# Patient Record
Sex: MALE | Race: OTHER | Employment: FULL TIME | URBAN - METROPOLITAN AREA
[De-identification: names, ages, dates, MRNs, and addresses within clinical notes are randomized per-mention and may not be internally consistent; named-entity substitution may affect disease eponyms.]

---

## 2019-03-02 ENCOUNTER — HOSPITAL ENCOUNTER (EMERGENCY)
Facility: HOSPITAL | Age: 39
Discharge: HOME | End: 2019-03-02
Attending: EMERGENCY MEDICINE
Payer: COMMERCIAL

## 2019-03-02 VITALS
TEMPERATURE: 98 F | SYSTOLIC BLOOD PRESSURE: 110 MMHG | WEIGHT: 128 LBS | OXYGEN SATURATION: 100 % | HEART RATE: 52 BPM | RESPIRATION RATE: 16 BRPM | DIASTOLIC BLOOD PRESSURE: 71 MMHG | HEIGHT: 64 IN | BODY MASS INDEX: 21.85 KG/M2

## 2019-03-02 DIAGNOSIS — S61.412A LACERATION OF LEFT HAND, FOREIGN BODY PRESENCE UNSPECIFIED, INITIAL ENCOUNTER: Primary | ICD-10-CM

## 2019-03-02 PROCEDURE — 90471 IMMUNIZATION ADMIN: CPT

## 2019-03-02 PROCEDURE — 12001 RPR S/N/AX/GEN/TRNK 2.5CM/<: CPT

## 2019-03-02 PROCEDURE — 99283 EMERGENCY DEPT VISIT LOW MDM: CPT

## 2019-03-02 NOTE — ED INITIAL ASSESSMENT (HPI)
Pt to ER c/o left hand laceration while cutting a wire at work. Bleeding is controlled at this time.

## 2019-03-02 NOTE — ED NOTES
Signs and symptoms of infection were discussed with the pt. Pt verbalized understanding. Pt was advised not to get his hand soaked for next couple of days.

## 2019-03-02 NOTE — ED NOTES
Pt's company is not contracted with [employer]. Pt was advised to follow up with his manager regarding further testing.

## 2019-03-02 NOTE — ED PROVIDER NOTES
Patient Seen in: [facility] Emergency Department    History   Patient presents with:  Laceration Abrasion (integumentary)    Stated Complaint: laceration to left hand from utility knife    HPI    80-year-old  male who presents emerged from t Reviewed - No data to display       Laceration was anesthetized with lidocaine locally. The wound was cleansed and irrigated copiously. There was no visible foreign body within the wound.  The wound was closed using a simple closure with 5-0 nylon and 5-0

## 2019-03-08 ENCOUNTER — HOSPITAL ENCOUNTER (EMERGENCY)
Facility: HOSPITAL | Age: 39
Discharge: HOME | End: 2019-03-08
Attending: EMERGENCY MEDICINE
Payer: COMMERCIAL

## 2019-03-08 VITALS
RESPIRATION RATE: 16 BRPM | DIASTOLIC BLOOD PRESSURE: 77 MMHG | TEMPERATURE: 98 F | OXYGEN SATURATION: 100 % | SYSTOLIC BLOOD PRESSURE: 114 MMHG | HEART RATE: 52 BPM

## 2019-03-08 DIAGNOSIS — Z51.89 VISIT FOR WOUND CHECK: Primary | ICD-10-CM

## 2019-03-08 PROCEDURE — 99281 EMR DPT VST MAYX REQ PHY/QHP: CPT

## 2019-03-08 NOTE — ED PROVIDER NOTES
Patient Seen in: [facility] Emergency Department    History   [facility]  [facility]    Stated Complaint: suture removal    HPI    Patient had a X-Acto knife injury to the left hand 6 days ago with sutures plac am    Follow-up:  Quique Kirby MD      As needed        Medications Prescribed:  There are no discharge medications for this patient.

## 2019-03-14 ENCOUNTER — HOSPITAL ENCOUNTER (EMERGENCY)
Facility: HOSPITAL | Age: 39
Discharge: HOME | End: 2019-03-14
Attending: EMERGENCY MEDICINE
Payer: COMMERCIAL

## 2019-03-14 VITALS
TEMPERATURE: 97 F | OXYGEN SATURATION: 99 % | RESPIRATION RATE: 14 BRPM | HEART RATE: 63 BPM | BODY MASS INDEX: 21.68 KG/M2 | DIASTOLIC BLOOD PRESSURE: 79 MMHG | SYSTOLIC BLOOD PRESSURE: 97 MMHG | WEIGHT: 127 LBS | HEIGHT: 64 IN

## 2019-03-14 DIAGNOSIS — Z48.02 ENCOUNTER FOR REMOVAL OF SUTURES: Primary | ICD-10-CM

## 2019-03-14 NOTE — ED PROVIDER NOTES
Patient Seen in: [facility] Emergency Department    History   Patient presents with:  Valencia Porter (ingtegumentary)    Stated Complaint: suture removal    HPI    Patient presents for suture removal.  The patient states that he has had the lin (primary encounter diagnosis)    Disposition:  Discharge  3/14/2019  7:24 am    Follow-up:  Ivania Mathews, [address] [place], [place] [phone]    Schedule an appointment as soon as possible for a visit  As needed        Medication

## 2021-11-11 ENCOUNTER — OFFICE VISIT (OUTPATIENT)
Dept: SURGERY | Facility: CLINIC | Age: 41
End: 2021-11-11
Payer: COMMERCIAL

## 2021-11-11 VITALS
TEMPERATURE: 98 F | WEIGHT: 132 LBS | BODY MASS INDEX: 22.53 KG/M2 | DIASTOLIC BLOOD PRESSURE: 84 MMHG | HEART RATE: 56 BPM | HEIGHT: 64 IN | SYSTOLIC BLOOD PRESSURE: 123 MMHG

## 2021-11-11 DIAGNOSIS — N50.89 PERINEAL MASS IN MALE: Primary | ICD-10-CM

## 2021-11-11 DIAGNOSIS — K64.2 PROLAPSED INTERNAL HEMORRHOIDS, GRADE 3: ICD-10-CM

## 2021-11-11 PROCEDURE — 3079F DIAST BP 80-89 MM HG: CPT | Performed by: COLON & RECTAL SURGERY

## 2021-11-11 PROCEDURE — 3008F BODY MASS INDEX DOCD: CPT | Performed by: COLON & RECTAL SURGERY

## 2021-11-11 PROCEDURE — 3074F SYST BP LT 130 MM HG: CPT | Performed by: COLON & RECTAL SURGERY

## 2021-11-11 PROCEDURE — 99204 OFFICE O/P NEW MOD 45 MIN: CPT | Performed by: COLON & RECTAL SURGERY

## 2021-11-11 PROCEDURE — 46600 DIAGNOSTIC ANOSCOPY SPX: CPT | Performed by: COLON & RECTAL SURGERY

## 2021-11-11 NOTE — PROCEDURES
Procedure:  Anoscopy    Surgeon: Madelyn Benedict    Anesthesia: None    Findings: See the progress note attached for all findings    Operative Summary: The patient was placed in a prone position on the proctoscopy table, the hips were flexed in the jackknife p

## 2021-11-11 NOTE — PATIENT INSTRUCTIONS
The patient presents today and consultation for discomfort around his rectum. The patient states that for the last 3 years he will have episodes on and off perianal itching.   The patient states that it will go in episodes where he will have the symptoms office. He will eventually require a colonoscopy, however we need to work-up this mass at this time.

## 2021-11-11 NOTE — H&P
New Patient Visit Note       Active Problems      1. 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 Prolapsed internal hemorrhoids, grade 3        Chief Complaint   Patient presents with:  Anal / Rectal Problem: New patient--c/o rectal issues for about 3 years.  c/o recteal b the accuracy of this note as detailed above    Margarita Blue MD FACS FAS[facility]    My total time spent with this patient on today's visit was 45 minutes.   This includes time in preparation, obtaining and reviewing history, performing the examination, counse urgency. Musculoskeletal: Negative for arthralgias and myalgias. Skin: Negative for color change and rash. Neurological: Negative for tremors, syncope and weakness. Hematological: Negative for adenopathy. Does not bruise/bleed easily.    Psychiatric hemorrhoid. Normal anal tone.       Comments: No Prostate Nodule  Anal Sphincter Intact  No Pruritis Ani  No Lichenification  No Abscess  No Fistula in ano  No Anterior Fissure  No Posterior Fissure  No Pilonidal Cyst    See Procedures:  [address] patient states that he does typically have constipation. He states he has about 3 bowel movements a week. He has occasionally noted blood in his stool as well.   He has never had a colonoscopy in the past.    The patient denies any family history of colon

## 2021-11-30 ENCOUNTER — TELEPHONE (OUTPATIENT)
Dept: SURGERY | Facility: CLINIC | Age: 41
End: 2021-11-30

## 2021-11-30 NOTE — TELEPHONE ENCOUNTER
No prior authorization needed for CPT 56839 MRI pelvis ordered by Dr. Kranthi Kellogg. Called patient to inform. He v/u.

## 2021-12-08 ENCOUNTER — HOSPITAL ENCOUNTER (OUTPATIENT)
Dept: MRI IMAGING | Age: 41
Discharge: HOME | End: 2021-12-08
Attending: COLON & RECTAL SURGERY
Payer: COMMERCIAL

## 2021-12-08 DIAGNOSIS — N50.89 PERINEAL MASS IN MALE: ICD-10-CM

## 2021-12-08 PROCEDURE — A9575 INJ GADOTERATE MEGLUMI 0.1ML: HCPCS | Performed by: COLON & RECTAL SURGERY

## 2021-12-08 PROCEDURE — 72197 MRI PELVIS W/O & W/DYE: CPT | Performed by: COLON & RECTAL SURGERY

## 2021-12-13 ENCOUNTER — OFFICE VISIT (OUTPATIENT)
Dept: SURGERY | Facility: CLINIC | Age: 41
End: 2021-12-13
Payer: COMMERCIAL

## 2021-12-13 VITALS
WEIGHT: 130.38 LBS | HEIGHT: 64 IN | DIASTOLIC BLOOD PRESSURE: 78 MMHG | BODY MASS INDEX: 22.26 KG/M2 | TEMPERATURE: 97 F | SYSTOLIC BLOOD PRESSURE: 114 MMHG | HEART RATE: 76 BPM

## 2021-12-13 DIAGNOSIS — N50.89 PERINEAL MASS IN MALE: Primary | ICD-10-CM

## 2021-12-13 PROCEDURE — 3074F SYST BP LT 130 MM HG: CPT | Performed by: COLON & RECTAL SURGERY

## 2021-12-13 PROCEDURE — 3078F DIAST BP <80 MM HG: CPT | Performed by: COLON & RECTAL SURGERY

## 2021-12-13 PROCEDURE — 99214 OFFICE O/P EST MOD 30 MIN: CPT | Performed by: COLON & RECTAL SURGERY

## 2021-12-13 PROCEDURE — 3008F BODY MASS INDEX DOCD: CPT | Performed by: COLON & RECTAL SURGERY

## 2021-12-14 NOTE — PATIENT INSTRUCTIONS
This patient presents for further care and evaluation of a perineal mass. The patient underwent an MRI to evaluate a palpable mass between his prostate and the perineum. There was definite distinct structure.   The patient himself felt symptoms at the s

## 2021-12-14 NOTE — PROGRESS NOTES
Follow Up Visit Note       Active Problems      1. 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 states is here to discuss MRI results with Dr. Elvin Sow.         History of Present Illness  This p vesicles are within normal limits. BLADDER:  There is diffuse wall thickening of the bladder despite it being decompressed is thicker than would be typically expected and may be related to urinary tract infection.   Please correlate with patient's urinaly distention, abdominal pain, anal bleeding, blood in stool, constipation, diarrhea, nausea and vomiting. Genitourinary: Negative for difficulty urinating, dysuria, frequency and urgency. Musculoskeletal: Negative for arthralgias and myalgias.    Skin: Ne lump between the prostate and the perineal body. The region is nontender. There are no fissures or fistulae. There is no evidence of abscess. We will send this patient over to urology.   The patient definitely has symptoms in the base of the penis whe

## 2023-03-02 ENCOUNTER — OFFICE VISIT (OUTPATIENT)
Dept: SURGERY | Facility: CLINIC | Age: 43
End: 2023-03-02

## 2023-03-02 DIAGNOSIS — N45.1 EPIDIDYMITIS: Primary | ICD-10-CM

## 2023-03-02 DIAGNOSIS — R82.90 URINE FINDING: ICD-10-CM

## 2023-03-02 DIAGNOSIS — N41.1 CHRONIC PROSTATITIS: ICD-10-CM

## 2023-03-02 LAB
APPEARANCE: CLEAR
BILIRUBIN: NEGATIVE
GLUCOSE (URINE DIPSTICK): NEGATIVE MG/DL
KETONES (URINE DIPSTICK): NEGATIVE MG/DL
LEUKOCYTES: NEGATIVE
MULTISTIX LOT#: NORMAL NUMERIC
NITRITE, URINE: NEGATIVE
OCCULT BLOOD: NEGATIVE
PH, URINE: 6 (ref 4.5–8)
PROTEIN (URINE DIPSTICK): NEGATIVE MG/DL
SPECIFIC GRAVITY: 1.02 (ref 1–1.03)
URINE-COLOR: YELLOW
UROBILINOGEN,SEMI-QN: 0.2 MG/DL (ref 0–1.9)

## 2023-03-02 RX ORDER — CIPROFLOXACIN 500 MG/1
500 TABLET, FILM COATED ORAL 2 TIMES DAILY
Qty: 28 TABLET | Refills: 0 | Status: SHIPPED | OUTPATIENT
Start: 2023-03-02 | End: 2023-03-16

## 2023-03-02 NOTE — PATIENT INSTRUCTIONS
Prostatitis  - for your flares  WHAT YOU SHOULD KNOW:   The prostate gland is a male sex gland. Fluid made by the prostate mixes with sperm (from the testicles) to make semen. Prostatitis (gben-[id number]-SMPF-tis) is an infection or inflammation of the prostate gland. Men of any age can have prostatitis, and they may get it more than once. Prostatitis may be caused by certain diseases, infections, procedures, or a large prostate gland. Prostatitis is not contagious (not able to be spread) to a sexual partner. Symptoms of prostatitis may include pain, problems urinating, blood in your urine, and fever. Medicine and certain therapies can be used to treat prostatitis. The prostate gland is the size and shape of a walnut. It is found in front of the rectum (area of the intestines that holds your bowel movements). The prostate wraps around the urethra and the neck of the bladder. Chronic prostatitis is more common in older men. It is usually an inflammatory condition and not an infection. But, bacterial infection can also cause chronic prostatitis. It can cause pain in the rectum, urethra, bladder, or scrotum. It can also make you unable to fully empty the bladder. You may urinate often, or have burning with urination. Prostatitis may also cause painful ejaculation, erectile dysfunction, or prolonged erections. Things that may trigger prostatitis include:  Sitting for long periods of time (especially on a hard surface or sitting on things that shake)  Squats/deadlifts  Sudden onset (acute) prostatitis usually occurs in men younger than 28. It is from a bacterial infection. You may have severe symptoms such as fever, chills, muscle aches, and pain in the area between the scrotum and anus (perineum). You may have a hard time urinating, or have pain or burning when urinating. There may be blood or pus in the urine.     Prostatitis can sometimes take 4-6 weeks to resolve    Home care  These guidelines will help you care for yourself at home:  Avoid sitting at a 90 degree angle for long periods of time. Standing or reclining is preferable. Avoid sitting on anything that shakes (tractors, lawn-mowers, long car rides) if possible. You may use a donut while sitting to avoid excessive pressure on the perineum/prostate while sitting. Drink plenty of fluids/water. Avoid/limit dietary irritants such as coffee, tea, carbonated beverages, sugary things, citrus fruits/juice, spicy foods. To relieve pain, put ice packs on the inflamed area. You can make your own ice pack by putting ice cubes in a sealed plastic bag wrapped in a thin towel. Soaking once or twice daily in a hot bath or using a heating pad may help alleviate discomfort in men suffering from chronic prostatitis. Would avoid excessive heat in the setting of acute prostatitis. You may use over-the-counter medicines to control pain, unless another medicine was given. Ibuprofen is typically a very effective anti-inflammatory pain medication. You may take 400 mg twice daily for 2 weeks regardless of symptoms and as needed for discomfort thereafter. If you have chronic liver or kidney disease, talk with your healthcare provider before taking these medicines. Also talk with your provider if you've ever had a stomach ulcer or GI bleeding. Urinate often, do not hold your bladder. If you have symptoms such as weak stream your doctor may prescribe an alpha-blocker such as tamsulosin (flomax). Make sure chronic pain issues are under good control, especially back pain issues as this is a significant risk factor for prostatitis/pelvic pain. Talk to your primary care or back specialist if your pain is not under adequate control. Constipation causes straining and pain. Avoid constipation by eating natural laxatives such as prunes, fresh fruits, and whole-grain cereals. If needed, use a mild over-the-counter (OTC) laxative for constipation.  An OTC stool softener such as colace may be used to keep the stools soft. If sex is uncomfortable or painful, avoid until symptoms get better. There is some evidence, however, that regular ejaculation may actually help improve prostatitis symptoms. You may have sex if you feel well. Epididymitis and Testicular Pain  The epididymis is a small tube next to the testicle that stores sperm. Inflammation of the epididymis can cause pain and swelling in your scrotum. The condition may be acute (sudden onset) or chronic (persisting for a longer period of time or recurrent). - Acute epididymitis is typically characterized by sudden onset pain, tenderness, swelling and redness.  - Chronic epididymitis is typically characterized by intermittent or long-term dull ache in one or both testicles but the pain can become severe at times. Causes  - Acute epididymitis is often caused by an infection. In sexually active men, it is often caused by a sexually transmitted disease (STD) such as chlamydia or gonorrhea. In boys and in men over 36, it can be from bacteria from other parts of the urinary tract (not an STD infection). It may also be caused by trauma. - Chronic epididymitis often may not be associated with an infectious process. Things that may irritate the testicles include sitting for long periods of time, squats or dead-lifts, motorcycles or biking, inflammatory disorders of the pelvis and other painful conditions that can affect the pelvis such as chronic low back pain. Constipation and other bowel-related issues can contribute to chronic testicular pain. Symptoms may begin with pain in the lower belly (abdomen) or low back. The pain then spreads down into the scrotum. Usually only one side is affected. The testicle and scrotum swell and become very painful and red. You may have fever and a burning when passing urine. Sometimes you may have a discharge from the penis.   Treatment is typically with antibiotics, and anti-inflammatory and pain medicines. The condition should get better over the first few days of treatment. But it will take several weeks for all the swelling and discomfort to go away. If your healthcare provider suspects that an STD is the cause, your sexual partners may need to be treated. Home care  The following will help you care for yourself at home:  If your healthcare gives you an antibiotic, take it exactly as you are told. Take it until it is all gone. Avoid sitting at a 90 degree angle for long periods of time. Standing or reclining is preferable. Avoid sitting on anything that shakes (tractors, lawn-mowers, long car rides) if possible. You may use a donut while sitting to avoid excessive pressure on the testicles while sitting. To relieve pain, put ice packs on the inflamed area. You can make your own ice pack by putting ice cubes in a sealed plastic bag wrapped in a thin towel. Soaking in a hot bath or using a heating pad may help alleviated discomfort in men suffering from chronic epididymitis. Would avoid excessive heat in the setting of acute epididymitis. You may use over-the-counter medicines to control pain, unless another medicine was given. Ibuprofen is typically a very effective anti-inflammatory pain medication. You may take 200-400 mg twice daily with plenty of water as needed for discomfort. If you have chronic liver or kidney disease, talk with your healthcare provider before taking these medicines. Also talk with your provider if you've ever had a stomach ulcer or GI bleeding. Make sure chronic pain issues are under good control, especially back pain issues as this is a significant risk factor for chronic testicular/pelvic pain. Talk to your primary care or back specialist if your pain is not under adequate control. Constipation can make you strain. This makes the pain worse. Avoid constipation by eating natural laxatives such as prunes, fresh fruits, and whole-grain cereals.  If necessary, use a mild over-the-counter laxative such as colace for constipation. Mineral oil can be used to keep the stools soft.

## (undated) NOTE — ED AVS SNAPSHOT
Karyle Alliance   MRN: [id number]    Department:  [facility] Emergency Department   Date of Visit:  3/14/2019           Disclosure     Insurance plans vary and the physician(s) referred by the ER may not be covered by your plan.  Please contact yo tell this physician (or your personal doctor if your instructions are to return to your personal doctor) about any new or lasting problems. The primary care or specialist physician will see patients referred from the [facility] Emergency Department.  Caleb Lyons

## (undated) NOTE — ED AVS SNAPSHOT
Shane Garcia   MRN: [id number]    Department:  [facility] Emergency Department   Date of Visit:  3/8/2019           Disclosure     Insurance plans vary and the physician(s) referred by the ER may not be covered by your plan.  Please contact you tell this physician (or your personal doctor if your instructions are to return to your personal doctor) about any new or lasting problems. The primary care or specialist physician will see patients referred from the [facility] Emergency Department.  Gennaro Jose

## (undated) NOTE — ED AVS SNAPSHOT
Mirza Burnham   MRN: [id number]    Department:  [facility] Emergency Department   Date of Visit:  3/2/2019           Disclosure     Insurance plans vary and the physician(s) referred by the ER may not be covered by your plan.  Please contact you tell this physician (or your personal doctor if your instructions are to return to your personal doctor) about any new or lasting problems. The primary care or specialist physician will see patients referred from the [facility] Emergency Department.  Salvadore Skiff

## (undated) NOTE — LETTER
21    Patient: Aron aJquez  : 1980 Visit date: 2021    Dear  Kiet Hernandez MD    Thank you for referring Aron Jaquez to my practice. Please find my assessment and plan below.     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